# Patient Record
Sex: MALE | Race: WHITE
[De-identification: names, ages, dates, MRNs, and addresses within clinical notes are randomized per-mention and may not be internally consistent; named-entity substitution may affect disease eponyms.]

---

## 2018-04-01 ENCOUNTER — HOSPITAL ENCOUNTER (EMERGENCY)
Dept: HOSPITAL 77 - KA.ED | Age: 78
LOS: 1 days | Discharge: NURSING, REHAB OR LONG-TERM CARE | End: 2018-04-02
Payer: MEDICARE

## 2018-04-01 DIAGNOSIS — K56.609: ICD-10-CM

## 2018-04-01 DIAGNOSIS — Z79.84: ICD-10-CM

## 2018-04-01 DIAGNOSIS — Z88.5: ICD-10-CM

## 2018-04-01 DIAGNOSIS — Z79.899: ICD-10-CM

## 2018-04-01 DIAGNOSIS — K52.9: Primary | ICD-10-CM

## 2018-04-01 PROCEDURE — 83690 ASSAY OF LIPASE: CPT

## 2018-04-01 PROCEDURE — 80053 COMPREHEN METABOLIC PANEL: CPT

## 2018-04-01 PROCEDURE — 85025 COMPLETE CBC W/AUTO DIFF WBC: CPT

## 2018-04-01 PROCEDURE — 99284 EMERGENCY DEPT VISIT MOD MDM: CPT

## 2018-04-01 PROCEDURE — 74021 RADEX ABDOMEN 3+ VIEWS: CPT

## 2018-04-01 PROCEDURE — 96372 THER/PROPH/DIAG INJ SC/IM: CPT

## 2018-04-01 PROCEDURE — 36415 COLL VENOUS BLD VENIPUNCTURE: CPT

## 2018-04-01 RX ADMIN — KETOROLAC TROMETHAMINE ONE MG: 60 INJECTION, SOLUTION INTRAMUSCULAR at 23:34

## 2018-04-01 NOTE — EDM.PDOC
ED HPI GENERAL MEDICAL PROBLEM





- General


Chief Complaint: Abdominal Pain


Stated Complaint: Stomach pain, diahreaa


Time Seen by Provider: 04/01/18 23:21


Source of Information: Reports: Patient, Family (wife)


History Limitations: Reports: No Limitations





- History of Present Illness


INITIAL COMMENTS - FREE TEXT/NARRATIVE: 





Patient presents with abdominal bloating and pain as well as diarrhea.  This 

started 5 days ago while he and his wife were returning from [place].  She had 

similar symptoms but cleared up after a couple days.  Nolan has worsened.  He is 

passing a lot of gas and some diarrhea he says.  He says he bloats up every 

time he eats no matter what he eats.  Tonight he was feeling better until he 

ate clam chowder for supper then he bloated back up.  He belches quite a bit 

too but for some reason he seems to be producing gas faster than he can get rid 

of it.  He saw his PCP two days ago and says labs and everything was normal.  

He was planning to go back tomorrow morning but tonight when he lay down in bed 

the pain was too much so he came in to ER.  His wife thinks he was told he has 

a hiatal hernia years ago but they are not sure.  He had appendix removed and 

had an umbilical hernia repaired with mesh in 1997.


  ** Abdomen


Pain Score (Numeric/FACES): 8





- Related Data


 Allergies











Allergy/AdvReac Type Severity Reaction Status Date / Time


 


codeine Allergy  Cannot Verified 04/01/18 23:12





   Remember  


 


prednisone Allergy  Edema Verified 04/01/18 23:12











Home Meds: 


 Home Meds





Diltiazem HCl [Diltiazem 24Hr ER] 120 mg PO DAILY 04/01/18 [History]


Doxazosin Mesylate 2 mg PO DAILY 04/01/18 [History]


Fluticasone/Salmeterol [Advair 250-50 Diskus] 1 puff INH DAILY 04/01/18 [History

]


Omeprazole 20 mg PO BID PRN 04/01/18 [History]


Simvastatin [Zocor] 20 mg PO DAILY 04/01/18 [History]


Triamt/Hctz 75 mg PO DAILY 04/01/18 [History]


metFORMIN HCl [Metformin HCl ER] 500 mg PO DAILY 04/01/18 [History]











ED ROS GENERAL





- Review of Systems


Review Of Systems: See Below


Constitutional: Denies: Fever, Chills, Weakness, Diaphoresis


HEENT: Denies: Throat Pain


Respiratory: Denies: Shortness of Breath, Cough


Cardiovascular: Denies: Chest Pain, Lightheadedness, Syncope


Endocrine: Reports: No Symptoms


GI/Abdominal: Reports: Abdominal Pain, Diarrhea, Distension, Nausea.  Denies: 

Black Stool, Bloody Stool, Vomiting


: Denies: Dysuria, Flank Pain


Musculoskeletal: Reports: No Symptoms


Skin: Denies: Cyanosis, Jaundice, Mottled, Pallor, Diaphoresis


Neurological: Denies: Confusion, Seizure, Syncope, Trouble Speaking, Difficulty 

Walking


Psychiatric: Denies: Agitation, Anxiety, Confusion





ED EXAM, GI/ABD





- Physical Exam


Exam: See Below


Exam Limited By: No Limitations


General Appearance: Alert, WD/WN, No Apparent Distress


Eyes: Bilateral: Normal Appearance, EOMI


Ears: Normal External Exam, Hearing Grossly Normal


Nose: Normal Inspection, No Blood


Throat/Mouth: Normal Inspection, Normal Lips, Normal Voice, No Airway Compromise


Head: Atraumatic, Normocephalic


Neck: Full Range of Motion


Respiratory/Chest: No Respiratory Distress, Lungs Clear, Normal Breath Sounds, 

No Accessory Muscle Use


Cardiovascular: Regular Rate, Rhythm, No Murmur


GI/Abdominal Exam: Normal Bowel Sounds, Distended (quite significantly), Tender 

(general).  No: Rigid (It doesn't feel rigid, just tightly distended), Hernia


Back Exam: No: CVA Tenderness (L), CVA Tenderness (R)


Extremities: Normal Inspection, Normal Range of Motion, Non-Tender


Neurological: Alert, Oriented, Normal Cognition, No Motor/Sensory Deficits


Psychiatric: Normal Affect, Normal Mood


Skin Exam: Warm, Dry, Intact, Normal Color, No Rash





Course





- Vital Signs


Last Recorded V/S: 


 Last Vital Signs











Temp  97.5 F   04/01/18 23:09


 


Pulse  76   04/01/18 23:09


 


Resp  16   04/01/18 23:09


 


BP  164/85 H  04/01/18 23:09


 


Pulse Ox  94 L  04/01/18 23:09














- Orders/Labs/Meds


Orders: 


 Active Orders 24 hr











 Category Date Time Status


 


 Abdomen 2V AP Upright Decub [CR] Stat Exams  04/01/18 23:05 Ordered











Labs: 


 Laboratory Tests











  04/01/18 04/02/18 Range/Units





  00:05 00:05 


 


WBC   6.4  (5.0-10.0)  10^3/uL


 


RBC   4.83  (4.50-6.00)  10^6/uL


 


Hgb   15.0  (13.0-17.0)  g/dL


 


Hct   44.8  (40.0-52.0)  %


 


MCV   92.8 H  (82.0-92.0)  fL


 


MCH   31.1 H  (27.0-31.0)  pg


 


MCHC   33.5  (32.0-36.0)  g/dL


 


RDW   13.2  (11.5-14.5)  %


 


Plt Count   215  (150-300)  10^3/uL


 


MPV   6.7 L  (7.4-10.4)  fL


 


Neut % (Auto)   73.1 H  (50.0-70.0)  %


 


Lymph % (Auto)   14.7 L  (20.0-40.0)  %


 


Mono % (Auto)   9.3 H  (2.0-8.0)  %


 


Eos % (Auto)   2.2  (1.0-3.0)  %


 


Baso % (Auto)   0.7  (0.0-1.0)  %


 


Neut # (Auto)   4.8  (2.5-7.0)  10^3/uL


 


Lymph # (Auto)   0.9 L  (1.0-4.0)  10^3/uL


 


Mono # (Auto)   0.6  (0.1-0.8)  10^3/uL


 


Eos # (Auto)   0.1  (0.1-0.3)  10^3/uL


 


Baso # (Auto)   0.0  (0.0-0.1)  10^3/uL


 


Sodium  142   (136-145)  mmol/L


 


Potassium  3.5   (3.3-5.3)  mmol/L


 


Chloride  102   ()  mmol/L


 


Carbon Dioxide  31.1   (21.0-32.0)  mmol/L


 


BUN  15   (6-25)  mg/dL


 


Creatinine  1.05   (0.51-1.17)  mg/dL


 


Est Cr Clr Drug Dosing  61.75   mL/min


 


Estimated GFR (MDRD)  > 60   mL/min


 


Glucose  156 H   ()  mg/dL


 


Calcium  8.5 L   (8.7-10.3)  mg/dL


 


Total Bilirubin  0.4   (0.2-1.0)  mg/dL


 


AST  19   (15-37)  U/L


 


ALT  27   (12-78)  U/L


 


Alkaline Phosphatase  58   ()  IU/L


 


Total Protein  6.4   (6.4-8.2)  g/dL


 


Albumin  3.27   (3.00-4.80)  g/dL


 


Lipase  71 L   ()  U/L











Meds: 


Medications














Discontinued Medications














Generic Name Dose Route Start Last Admin





  Trade Name Arunq  PRN Reason Stop Dose Admin


 


Ketorolac Tromethamine  60 mg  04/01/18 23:28  04/01/18 23:34





  Toradol  IM  04/01/18 23:29  60 mg





  ONETIME ONE   Administration





     





     





     





     














- Re-Assessments/Exams


Free Text/Narrative Re-Assessment/Exam: 





04/02/18 00:36


Abdominal films show a small bowel obstruction per radiologist.  Patient rates 

pain down from 8/10 to 4-5 after the Toradol given soon after arrival.  

Discussed xray findings with patient and will discuss with surgeon.  He wants 

[facility] in [place] if he needs to transfer somewhere.  I discussed case with Gen 

Surg Dr. Dash and hospitalist Dr. Rae who accepted for transfer and 

management.  Patient is stable and can go by private vehicle.  Daughter will be 

driving him up to [place].





Departure





- Departure


Time of Disposition: 00:35


Disposition: DC/Tfer to Acute Hospital [facility]


Condition: Good


Clinical Impression: 


 SBO (small bowel obstruction), Gastroenteritis, Abdominal bloating








- Discharge Information


Forms:  ED Department Discharge





- My Orders


Last 24 Hours: 


My Active Orders





04/01/18 23:05


Abdomen 2V AP Upright Decub [CR] Stat 














- Assessment/Plan


Last 24 Hours: 


My Active Orders





04/01/18 23:05


Abdomen 2V AP Upright Decub [CR] Stat

## 2018-04-02 LAB
CHLORIDE SERPL-SCNC: 102 MMOL/L (ref 98–115)
SODIUM SERPL-SCNC: 142 MMOL/L (ref 136–145)